# Patient Record
Sex: MALE | Race: WHITE | HISPANIC OR LATINO
[De-identification: names, ages, dates, MRNs, and addresses within clinical notes are randomized per-mention and may not be internally consistent; named-entity substitution may affect disease eponyms.]

---

## 2017-03-23 PROBLEM — Z00.00 ENCOUNTER FOR PREVENTIVE HEALTH EXAMINATION: Status: ACTIVE | Noted: 2017-03-23

## 2017-03-30 ENCOUNTER — APPOINTMENT (OUTPATIENT)
Dept: GASTROENTEROLOGY | Facility: CLINIC | Age: 47
End: 2017-03-30

## 2022-01-16 ENCOUNTER — EMERGENCY (EMERGENCY)
Facility: HOSPITAL | Age: 52
LOS: 1 days | Discharge: HOME | End: 2022-01-16
Attending: EMERGENCY MEDICINE
Payer: SELF-PAY

## 2022-01-16 VITALS
OXYGEN SATURATION: 99 % | HEART RATE: 78 BPM | TEMPERATURE: 98 F | HEIGHT: 69 IN | SYSTOLIC BLOOD PRESSURE: 132 MMHG | RESPIRATION RATE: 18 BRPM | WEIGHT: 199.96 LBS | DIASTOLIC BLOOD PRESSURE: 84 MMHG

## 2022-01-16 PROCEDURE — 99283 EMERGENCY DEPT VISIT LOW MDM: CPT

## 2022-01-16 RX ORDER — OLOPATADINE HYDROCHLORIDE 1 MG/ML
1 SOLUTION/ DROPS OPHTHALMIC
Qty: 28 | Refills: 0
Start: 2022-01-16 | End: 2022-01-29

## 2022-01-16 RX ORDER — KETOTIFEN FUMARATE 0.34 MG/ML
1 SOLUTION OPHTHALMIC
Refills: 0 | Status: ACTIVE | OUTPATIENT
Start: 2022-01-16 | End: 2022-12-15

## 2022-01-16 RX ORDER — OLOPATADINE HYDROCHLORIDE 1 MG/ML
1 SOLUTION/ DROPS OPHTHALMIC
Qty: 1 | Refills: 0
Start: 2022-01-16 | End: 2022-01-29

## 2022-01-16 RX ADMIN — KETOTIFEN FUMARATE 1 DROP(S): 0.34 SOLUTION OPHTHALMIC at 20:05

## 2022-01-16 NOTE — ED PROVIDER NOTE - CLINICAL SUMMARY MEDICAL DECISION MAKING FREE TEXT BOX
male vitals stable nontoxic appearing with photophobia sclera and conjunctiva injected. no uri symptoms of note. ? dust exposure no corneal abrasions or fbs

## 2022-01-16 NOTE — ED ADULT TRIAGE NOTE - CHIEF COMPLAINT QUOTE
pt states pain & redness to both eyes, pain started this am,   nothing else bothering him   A&Ox3, both eyes red

## 2022-01-16 NOTE — ED PROVIDER NOTE - PHYSICAL EXAMINATION
Gen: Well appearing in NAD  Head: NC/AT  Neck: trachea midline  Resp:  No distress  Ext: no deformities  Neuro:  A&O appears non focal  Skin:  Warm and dry as visualized  Psych:  Normal affect and mood    eyes SHAD EOMI acuity 20/20 bilaterally sclera and conjunctiva injected + clear tearing to eyes bilaterally. Florescence without corneal abrasion. lid eversion without fbs noted bilaterally

## 2022-01-16 NOTE — ED PROVIDER NOTE - OBJECTIVE STATEMENT
52 yo male no reported health issues presenting to the ER with eye redness since this morning, reports that started in both eyes with clear discharge no crusting at lids no fb sensation. no difficulty seeing no changes in vision +photophobia. no accidents or injuries. no fever uri symptoms. no recent sick contacts no travel. reports did place some package stickers in a [place] area. unsure if symptoms are because of that. no corrective eye glasses or contacts.

## 2022-01-16 NOTE — ED PROVIDER NOTE - CARE PROVIDER_API CALL
Cecy Ware)  Ophthalmology  [address] [place] [address]  Phone: [phone]  Fax: [phone]  Follow Up Time: Urgent

## 2022-01-16 NOTE — ED PROVIDER NOTE - PROGRESS NOTE DETAILS
advised on need for eye drops and fu with opthomology   given referral and strict return precautions

## 2022-01-16 NOTE — ED PROVIDER NOTE - ATTENDING CONTRIBUTION TO CARE
51yoM; with no signif PMH; now p/w b/l eye redness, itching and light sensitivity.  denies drainage from eye. states at work, a large amt of dust flew into eyes. denies headache. denies blurry vision.  EXAM:  injected conjuntiva, EOMI, nt over temporal forehead.   A/P:  51yoM p/w b/l eye redness  -patanol  -steroid gtt  -f/up with ophtho.

## 2022-01-16 NOTE — ED ADULT NURSE NOTE - OBJECTIVE STATEMENT
assumed care of pt at 19:30. pt triaged, treated and dispo by provider, pt verbalized understanding of discharge instructions, pt medicated prior to discharge, refer to provider notes..

## 2022-01-16 NOTE — ED PROVIDER NOTE - PATIENT PORTAL LINK FT
You can access the FollowMyHealth Patient Portal offered by [facility] by registering at the following website: [email or web address]. By joining [employer]’s FollowMyHealth portal, you will also be able to view your health information using other applications (apps) compatible with our system.